# Patient Record
Sex: FEMALE | Race: WHITE | NOT HISPANIC OR LATINO | Employment: PART TIME | URBAN - METROPOLITAN AREA
[De-identification: names, ages, dates, MRNs, and addresses within clinical notes are randomized per-mention and may not be internally consistent; named-entity substitution may affect disease eponyms.]

---

## 2023-05-24 ENCOUNTER — SOCIAL WORK (OUTPATIENT)
Dept: ADMINISTRATIVE | Facility: OTHER | Age: 23
End: 2023-05-24

## 2023-05-24 NOTE — PROGRESS NOTES
[facility] received a referral to assist with counseling. [facility] had received a consult to see Patient. However, the Clinic does not have a counselor on staff. Sw mailed Patient a list of counselors. She was encouraged to contact one to schedule an assessment if she was still in need of services.    Zeinab White LCSW    [phone]

## 2023-12-27 PROBLEM — G40.909 SEIZURE DISORDER: Status: ACTIVE | Noted: 2023-12-27

## 2023-12-27 PROBLEM — F95.0 TRANSIENT TICS: Status: ACTIVE | Noted: 2023-12-27

## 2024-01-27 PROBLEM — F44.5 PSYCHOGENIC NONEPILEPTIC SEIZURE: Status: ACTIVE | Noted: 2023-12-27

## 2024-04-02 PROBLEM — G40.909 SEIZURE DISORDER: Status: RESOLVED | Noted: 2023-12-27 | Resolved: 2024-04-02
